# Patient Record
Sex: FEMALE | Race: WHITE | NOT HISPANIC OR LATINO | URBAN - METROPOLITAN AREA
[De-identification: names, ages, dates, MRNs, and addresses within clinical notes are randomized per-mention and may not be internally consistent; named-entity substitution may affect disease eponyms.]

---

## 2019-08-12 ENCOUNTER — INPATIENT (INPATIENT)
Facility: HOSPITAL | Age: 49
LOS: 2 days | Discharge: HOME | DRG: 585 | End: 2019-08-15
Attending: PLASTIC SURGERY | Admitting: PLASTIC SURGERY
Payer: COMMERCIAL

## 2019-08-12 VITALS
RESPIRATION RATE: 18 BRPM | SYSTOLIC BLOOD PRESSURE: 124 MMHG | OXYGEN SATURATION: 97 % | TEMPERATURE: 98 F | HEART RATE: 74 BPM | WEIGHT: 153.66 LBS | DIASTOLIC BLOOD PRESSURE: 81 MMHG | HEIGHT: 65 IN

## 2019-08-12 DIAGNOSIS — Z98.82 BREAST IMPLANT STATUS: Chronic | ICD-10-CM

## 2019-08-12 DIAGNOSIS — Z90.12 ACQUIRED ABSENCE OF LEFT BREAST AND NIPPLE: Chronic | ICD-10-CM
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
## 2019-08-12 DIAGNOSIS — Z98.891 HISTORY OF UTERINE SCAR FROM PREVIOUS SURGERY: Chronic | ICD-10-CM

## 2019-08-12 RX ORDER — HYDROMORPHONE HYDROCHLORIDE 2 MG/ML
0.5 INJECTION INTRAMUSCULAR; INTRAVENOUS; SUBCUTANEOUS
Refills: 0 | Status: DISCONTINUED | OUTPATIENT
Start: 2019-08-12 | End: 2019-08-15

## 2019-08-12 RX ORDER — KETOROLAC TROMETHAMINE 30 MG/ML
15 SYRINGE (ML) INJECTION EVERY 6 HOURS
Refills: 0 | Status: DISCONTINUED | OUTPATIENT
Start: 2019-08-12 | End: 2019-08-15

## 2019-08-12 RX ORDER — SERTRALINE 25 MG/1
1 TABLET, FILM COATED ORAL
Qty: 0 | Refills: 0 | DISCHARGE

## 2019-08-12 RX ORDER — CEFAZOLIN SODIUM 1 G
2000 VIAL (EA) INJECTION EVERY 8 HOURS
Refills: 0 | Status: DISCONTINUED | OUTPATIENT
Start: 2019-08-12 | End: 2019-08-12

## 2019-08-12 RX ORDER — CEFAZOLIN SODIUM 1 G
2000 VIAL (EA) INJECTION EVERY 8 HOURS
Refills: 0 | Status: DISCONTINUED | OUTPATIENT
Start: 2019-08-12 | End: 2019-08-13

## 2019-08-12 RX ORDER — HEPARIN SODIUM 5000 [USP'U]/ML
5000 INJECTION INTRAVENOUS; SUBCUTANEOUS EVERY 8 HOURS
Refills: 0 | Status: DISCONTINUED | OUTPATIENT
Start: 2019-08-13 | End: 2019-08-13

## 2019-08-12 RX ORDER — SODIUM CHLORIDE 9 MG/ML
1000 INJECTION, SOLUTION INTRAVENOUS
Refills: 0 | Status: DISCONTINUED | OUTPATIENT
Start: 2019-08-12 | End: 2019-08-13

## 2019-08-12 RX ORDER — ACETAMINOPHEN 500 MG
1000 TABLET ORAL ONCE
Refills: 0 | Status: COMPLETED | OUTPATIENT
Start: 2019-08-12 | End: 2019-08-12

## 2019-08-12 RX ORDER — BUPIVACAINE 13.3 MG/ML
20 INJECTION, SUSPENSION, LIPOSOMAL INFILTRATION ONCE
Refills: 0 | Status: DISCONTINUED | OUTPATIENT
Start: 2019-08-12 | End: 2019-08-15

## 2019-08-12 RX ORDER — ACETAMINOPHEN 500 MG
1000 TABLET ORAL ONCE
Refills: 0 | Status: COMPLETED | OUTPATIENT
Start: 2019-08-13 | End: 2019-08-13

## 2019-08-12 RX ORDER — OXYCODONE HYDROCHLORIDE 5 MG/1
10 TABLET ORAL EVERY 6 HOURS
Refills: 0 | Status: DISCONTINUED | OUTPATIENT
Start: 2019-08-12 | End: 2019-08-15

## 2019-08-12 RX ORDER — OXYCODONE HYDROCHLORIDE 5 MG/1
5 TABLET ORAL EVERY 6 HOURS
Refills: 0 | Status: DISCONTINUED | OUTPATIENT
Start: 2019-08-12 | End: 2019-08-15

## 2019-08-12 RX ORDER — ONDANSETRON 8 MG/1
4 TABLET, FILM COATED ORAL EVERY 6 HOURS
Refills: 0 | Status: DISCONTINUED | OUTPATIENT
Start: 2019-08-12 | End: 2019-08-15

## 2019-08-12 RX ADMIN — Medication 15 MILLIGRAM(S): at 18:30

## 2019-08-12 RX ADMIN — OXYCODONE HYDROCHLORIDE 5 MILLIGRAM(S): 5 TABLET ORAL at 22:20

## 2019-08-12 RX ADMIN — Medication 15 MILLIGRAM(S): at 18:54

## 2019-08-12 RX ADMIN — SODIUM CHLORIDE 150 MILLILITER(S): 9 INJECTION, SOLUTION INTRAVENOUS at 18:55

## 2019-08-12 RX ADMIN — Medication 2000 MILLIGRAM(S): at 22:13

## 2019-08-12 RX ADMIN — Medication 400 MILLIGRAM(S): at 20:32

## 2019-08-12 NOTE — H&P PST ADULT - NSICDXPROBLEM_GEN_ALL_CORE_FT
PROBLEM DIAGNOSES  Problem: Breast CA  Assessment and Plan:   - OR today  - NPO  - admit to plastic surgery

## 2019-08-12 NOTE — H&P PST ADULT - ASSESSMENT
50 y/o female with hx of breast cancer presents for removal of textured L breast implant and reconstruction with vickey flaps

## 2019-08-12 NOTE — H&P PST ADULT - HISTORY OF PRESENT ILLNESS
50 y/o female with hx left breast ca and reconstruction with textured implant, presents today for removal of implant and reconstruction with DONNIE flaps. She denies any complaints at this time.

## 2019-08-13 LAB
ANION GAP SERPL CALC-SCNC: 6 MMOL/L — SIGNIFICANT CHANGE UP (ref 5–17)
BUN SERPL-MCNC: 12 MG/DL — SIGNIFICANT CHANGE UP (ref 7–23)
CALCIUM SERPL-MCNC: 8.6 MG/DL — SIGNIFICANT CHANGE UP (ref 8.4–10.5)
CHLORIDE SERPL-SCNC: 106 MMOL/L — SIGNIFICANT CHANGE UP (ref 96–108)
CO2 SERPL-SCNC: 31 MMOL/L — SIGNIFICANT CHANGE UP (ref 22–31)
CREAT SERPL-MCNC: 0.7 MG/DL — SIGNIFICANT CHANGE UP (ref 0.5–1.3)
GLUCOSE SERPL-MCNC: 101 MG/DL — HIGH (ref 70–99)
HCT VFR BLD CALC: 34.4 % — LOW (ref 34.5–45)
HGB BLD-MCNC: 10.8 G/DL — LOW (ref 11.5–15.5)
MCHC RBC-ENTMCNC: 31.4 GM/DL — LOW (ref 32–36)
MCHC RBC-ENTMCNC: 31.6 PG — SIGNIFICANT CHANGE UP (ref 27–34)
MCV RBC AUTO: 100.6 FL — HIGH (ref 80–100)
NRBC # BLD: 0 /100 WBCS — SIGNIFICANT CHANGE UP (ref 0–0)
PLATELET # BLD AUTO: 205 K/UL — SIGNIFICANT CHANGE UP (ref 150–400)
POTASSIUM SERPL-MCNC: 4.6 MMOL/L — SIGNIFICANT CHANGE UP (ref 3.5–5.3)
POTASSIUM SERPL-SCNC: 4.6 MMOL/L — SIGNIFICANT CHANGE UP (ref 3.5–5.3)
RBC # BLD: 3.42 M/UL — LOW (ref 3.8–5.2)
RBC # FLD: 12.7 % — SIGNIFICANT CHANGE UP (ref 10.3–14.5)
SODIUM SERPL-SCNC: 143 MMOL/L — SIGNIFICANT CHANGE UP (ref 135–145)
WBC # BLD: 6.72 K/UL — SIGNIFICANT CHANGE UP (ref 3.8–10.5)
WBC # FLD AUTO: 6.72 K/UL — SIGNIFICANT CHANGE UP (ref 3.8–10.5)

## 2019-08-13 RX ORDER — BENZOCAINE AND MENTHOL 5; 1 G/100ML; G/100ML
1 LIQUID ORAL
Refills: 0 | Status: DISCONTINUED | OUTPATIENT
Start: 2019-08-13 | End: 2019-08-15

## 2019-08-13 RX ORDER — DIAZEPAM 5 MG
5 TABLET ORAL EVERY 6 HOURS
Refills: 0 | Status: DISCONTINUED | OUTPATIENT
Start: 2019-08-13 | End: 2019-08-15

## 2019-08-13 RX ORDER — HEPARIN SODIUM 5000 [USP'U]/ML
5000 INJECTION INTRAVENOUS; SUBCUTANEOUS EVERY 8 HOURS
Refills: 0 | Status: DISCONTINUED | OUTPATIENT
Start: 2019-08-13 | End: 2019-08-15

## 2019-08-13 RX ORDER — ACETAMINOPHEN 500 MG
650 TABLET ORAL EVERY 6 HOURS
Refills: 0 | Status: DISCONTINUED | OUTPATIENT
Start: 2019-08-13 | End: 2019-08-15

## 2019-08-13 RX ORDER — SODIUM CHLORIDE 9 MG/ML
1000 INJECTION, SOLUTION INTRAVENOUS
Refills: 0 | Status: DISCONTINUED | OUTPATIENT
Start: 2019-08-13 | End: 2019-08-13

## 2019-08-13 RX ADMIN — Medication 15 MILLIGRAM(S): at 00:36

## 2019-08-13 RX ADMIN — OXYCODONE HYDROCHLORIDE 10 MILLIGRAM(S): 5 TABLET ORAL at 16:30

## 2019-08-13 RX ADMIN — OXYCODONE HYDROCHLORIDE 5 MILLIGRAM(S): 5 TABLET ORAL at 05:00

## 2019-08-13 RX ADMIN — Medication 75 MILLIGRAM(S): at 17:30

## 2019-08-13 RX ADMIN — Medication 15 MILLIGRAM(S): at 18:36

## 2019-08-13 RX ADMIN — Medication 15 MILLIGRAM(S): at 01:15

## 2019-08-13 RX ADMIN — OXYCODONE HYDROCHLORIDE 5 MILLIGRAM(S): 5 TABLET ORAL at 04:21

## 2019-08-13 RX ADMIN — OXYCODONE HYDROCHLORIDE 5 MILLIGRAM(S): 5 TABLET ORAL at 00:00

## 2019-08-13 RX ADMIN — HEPARIN SODIUM 5000 UNIT(S): 5000 INJECTION INTRAVENOUS; SUBCUTANEOUS at 14:36

## 2019-08-13 RX ADMIN — Medication 15 MILLIGRAM(S): at 23:30

## 2019-08-13 RX ADMIN — Medication 15 MILLIGRAM(S): at 17:30

## 2019-08-13 RX ADMIN — OXYCODONE HYDROCHLORIDE 5 MILLIGRAM(S): 5 TABLET ORAL at 11:11

## 2019-08-13 RX ADMIN — OXYCODONE HYDROCHLORIDE 10 MILLIGRAM(S): 5 TABLET ORAL at 15:06

## 2019-08-13 RX ADMIN — OXYCODONE HYDROCHLORIDE 5 MILLIGRAM(S): 5 TABLET ORAL at 20:00

## 2019-08-13 RX ADMIN — Medication 5 MILLIGRAM(S): at 11:16

## 2019-08-13 RX ADMIN — Medication 1000 MILLIGRAM(S): at 08:15

## 2019-08-13 RX ADMIN — Medication 2000 MILLIGRAM(S): at 05:46

## 2019-08-13 RX ADMIN — Medication 400 MILLIGRAM(S): at 07:48

## 2019-08-13 RX ADMIN — OXYCODONE HYDROCHLORIDE 5 MILLIGRAM(S): 5 TABLET ORAL at 10:22

## 2019-08-13 RX ADMIN — Medication 15 MILLIGRAM(S): at 23:02

## 2019-08-13 RX ADMIN — HEPARIN SODIUM 5000 UNIT(S): 5000 INJECTION INTRAVENOUS; SUBCUTANEOUS at 05:46

## 2019-08-13 RX ADMIN — HEPARIN SODIUM 5000 UNIT(S): 5000 INJECTION INTRAVENOUS; SUBCUTANEOUS at 21:54

## 2019-08-13 RX ADMIN — Medication 5 MILLIGRAM(S): at 21:55

## 2019-08-13 RX ADMIN — Medication 15 MILLIGRAM(S): at 06:00

## 2019-08-13 RX ADMIN — BENZOCAINE AND MENTHOL 1 LOZENGE: 5; 1 LIQUID ORAL at 22:04

## 2019-08-13 RX ADMIN — Medication 15 MILLIGRAM(S): at 13:06

## 2019-08-13 RX ADMIN — Medication 15 MILLIGRAM(S): at 05:46

## 2019-08-13 RX ADMIN — OXYCODONE HYDROCHLORIDE 5 MILLIGRAM(S): 5 TABLET ORAL at 19:26

## 2019-08-13 RX ADMIN — Medication 15 MILLIGRAM(S): at 14:52

## 2019-08-13 NOTE — PROGRESS NOTE ADULT - ASSESSMENT
Pt doing well. Will continue to monitor left DONNIE flaps. Will prepare for discharge home tomorrow.

## 2019-08-13 NOTE — PROGRESS NOTE ADULT - SUBJECTIVE AND OBJECTIVE BOX
POD1 s/p L breast reconstruction - implant removal, capsulectomy, and stacked DONNIE flaps.    No events overnight.     Vital Signs Last 24 Hrs  T(C): 36.7 (13 Aug 2019 05:04), Max: 36.8 (12 Aug 2019 22:02)  T(F): 98.1 (13 Aug 2019 05:04), Max: 98.2 (12 Aug 2019 22:02)  HR: 82 (13 Aug 2019 04:15) (82 - 112)  BP: 105/56 (13 Aug 2019 04:15) (102/52 - 126/63)  BP(mean): 76 (13 Aug 2019 04:15) (75 - 82)  RR: 17 (13 Aug 2019 04:15) (14 - 18)  SpO2: 97% (13 Aug 2019 04:15) (94% - 100%)    Flaps soft, warm, 2s cap refill. Arterial doppler signals audible.  Abdomen with Prevena dressing in place. Incisions intact.  JPs serosang. POD1 s/p L breast reconstruction - implant removal, capsulectomy, and stacked DONNIE flaps.    No events overnight.     Vital Signs Last 24 Hrs  T(C): 36.7 (13 Aug 2019 05:04), Max: 36.8 (12 Aug 2019 22:02)  T(F): 98.1 (13 Aug 2019 05:04), Max: 98.2 (12 Aug 2019 22:02)  HR: 82 (13 Aug 2019 04:15) (82 - 112)  BP: 105/56 (13 Aug 2019 04:15) (102/52 - 126/63)  BP(mean): 76 (13 Aug 2019 04:15) (75 - 82)  RR: 17 (13 Aug 2019 04:15) (14 - 18)  SpO2: 97% (13 Aug 2019 04:15) (94% - 100%)    Flaps soft, warm, 2s cap refill. Arterial doppler signals audible.  Abdomen with dressings CDI, mild strikethrough. Incisions intact.  JPs serosang.

## 2019-08-13 NOTE — PROGRESS NOTE ADULT - SUBJECTIVE AND OBJECTIVE BOX
Pt doing well 1 day s/p left removal of implant and stacked DONNIE flap surgery to left breast. Pain is controlled. Pt is ambulating. Vitals stable. Incisions are clean, dry and intact. Left flaps are viable with a cap refill of 2 seconds and strong arterial pulses.

## 2019-08-14 DIAGNOSIS — C50.919 MALIGNANT NEOPLASM OF UNSPECIFIED SITE OF UNSPECIFIED FEMALE BREAST: ICD-10-CM

## 2019-08-14 RX ORDER — BACITRACIN ZINC 500 UNIT/G
1 OINTMENT IN PACKET (EA) TOPICAL
Refills: 0 | Status: DISCONTINUED | OUTPATIENT
Start: 2019-08-14 | End: 2019-08-15

## 2019-08-14 RX ADMIN — HEPARIN SODIUM 5000 UNIT(S): 5000 INJECTION INTRAVENOUS; SUBCUTANEOUS at 22:10

## 2019-08-14 RX ADMIN — Medication 15 MILLIGRAM(S): at 11:15

## 2019-08-14 RX ADMIN — Medication 15 MILLIGRAM(S): at 07:38

## 2019-08-14 RX ADMIN — OXYCODONE HYDROCHLORIDE 5 MILLIGRAM(S): 5 TABLET ORAL at 18:15

## 2019-08-14 RX ADMIN — Medication 15 MILLIGRAM(S): at 18:15

## 2019-08-14 RX ADMIN — OXYCODONE HYDROCHLORIDE 10 MILLIGRAM(S): 5 TABLET ORAL at 03:26

## 2019-08-14 RX ADMIN — Medication 15 MILLIGRAM(S): at 23:44

## 2019-08-14 RX ADMIN — HEPARIN SODIUM 5000 UNIT(S): 5000 INJECTION INTRAVENOUS; SUBCUTANEOUS at 14:40

## 2019-08-14 RX ADMIN — OXYCODONE HYDROCHLORIDE 10 MILLIGRAM(S): 5 TABLET ORAL at 04:00

## 2019-08-14 RX ADMIN — Medication 75 MILLIGRAM(S): at 07:08

## 2019-08-14 RX ADMIN — Medication 5 MILLIGRAM(S): at 22:10

## 2019-08-14 RX ADMIN — BENZOCAINE AND MENTHOL 1 LOZENGE: 5; 1 LIQUID ORAL at 07:09

## 2019-08-14 RX ADMIN — OXYCODONE HYDROCHLORIDE 10 MILLIGRAM(S): 5 TABLET ORAL at 12:14

## 2019-08-14 RX ADMIN — Medication 75 MILLIGRAM(S): at 18:00

## 2019-08-14 RX ADMIN — HEPARIN SODIUM 5000 UNIT(S): 5000 INJECTION INTRAVENOUS; SUBCUTANEOUS at 07:09

## 2019-08-14 RX ADMIN — Medication 15 MILLIGRAM(S): at 12:14

## 2019-08-14 RX ADMIN — Medication 15 MILLIGRAM(S): at 07:08

## 2019-08-14 RX ADMIN — OXYCODONE HYDROCHLORIDE 10 MILLIGRAM(S): 5 TABLET ORAL at 11:15

## 2019-08-14 RX ADMIN — Medication 15 MILLIGRAM(S): at 18:00

## 2019-08-14 RX ADMIN — OXYCODONE HYDROCHLORIDE 5 MILLIGRAM(S): 5 TABLET ORAL at 19:15

## 2019-08-14 RX ADMIN — Medication 1 APPLICATION(S): at 18:00

## 2019-08-14 NOTE — PROGRESS NOTE ADULT - SUBJECTIVE AND OBJECTIVE BOX
SUBJECTIVE:  Doing well.   No overnight events.     OBJECTIVE:     ** VITAL SIGNS / I&O's **    Vital Signs Last 24 Hrs  T(C): 36.6 (14 Aug 2019 04:50), Max: 37.6 (13 Aug 2019 17:06)  T(F): 97.9 (14 Aug 2019 04:50), Max: 99.6 (13 Aug 2019 17:06)  HR: 88 (14 Aug 2019 05:20) (85 - 99)  BP: 119/71 (14 Aug 2019 05:20) (94/50 - 136/63)  BP(mean): 91 (14 Aug 2019 05:20) (65 - 91)  RR: 16 (14 Aug 2019 05:20) (16 - 19)  SpO2: 95% (14 Aug 2019 05:20) (93% - 98%)      13 Aug 2019 07:01  -  14 Aug 2019 07:00  --------------------------------------------------------  IN:    lactated ringers.: 100 mL    lactated ringers.: 150 mL    Oral Fluid: 720 mL  Total IN: 970 mL    OUT:    Bulb: 105 mL    Bulb: 300 mL    Bulb: 95 mL    Indwelling Catheter - Urethral: 425 mL    Voided: 650 mL  Total OUT: 1575 mL    Total NET: -605 mL          ** PHYSICAL EXAM **    -- CONSTITUTIONAL: AOx3. NAD.   -- BREASTS: L soft, no collections, skin paddle good color x 2, + doppler signal x 2, JPs serosanguinous  -- RESPIRATORY: unlabored, no respiratory distress  -- ABDOMEN: Soft. No collections. Prevena in place and holding suction, BREA's serosanguinous.    ** LABS**                          10.8   6.72  )-----------( 205      ( 13 Aug 2019 08:14 )             34.4     13 Aug 2019 08:14    143    |  106    |  12     ----------------------------<  101    4.6     |  31     |  0.70     Ca    8.6        13 Aug 2019 08:14        CAPILLARY BLOOD GLUCOSE SUBJECTIVE:  Doing well.   No overnight events.     OBJECTIVE:     ** VITAL SIGNS / I&O's **    Vital Signs Last 24 Hrs  T(C): 36.6 (14 Aug 2019 04:50), Max: 37.6 (13 Aug 2019 17:06)  T(F): 97.9 (14 Aug 2019 04:50), Max: 99.6 (13 Aug 2019 17:06)  HR: 88 (14 Aug 2019 05:20) (85 - 99)  BP: 119/71 (14 Aug 2019 05:20) (94/50 - 136/63)  BP(mean): 91 (14 Aug 2019 05:20) (65 - 91)  RR: 16 (14 Aug 2019 05:20) (16 - 19)  SpO2: 95% (14 Aug 2019 05:20) (93% - 98%)      13 Aug 2019 07:01  -  14 Aug 2019 07:00  --------------------------------------------------------  IN:    lactated ringers.: 100 mL    lactated ringers.: 150 mL    Oral Fluid: 720 mL  Total IN: 970 mL    OUT:    Bulb: 105 mL    Bulb: 300 mL    Bulb: 95 mL    Indwelling Catheter - Urethral: 425 mL    Voided: 650 mL  Total OUT: 1575 mL    Total NET: -605 mL          ** PHYSICAL EXAM **    -- CONSTITUTIONAL: AOx3. NAD.   -- BREASTS: L soft, no collections, skin paddle good color x 2, + doppler signal x 2, JPs serosanguinous  -- RESPIRATORY: unlabored, no respiratory distress  -- ABDOMEN: Soft. No collections. BREA's serosanguinous.    ** LABS**                          10.8   6.72  )-----------( 205      ( 13 Aug 2019 08:14 )             34.4     13 Aug 2019 08:14    143    |  106    |  12     ----------------------------<  101    4.6     |  31     |  0.70     Ca    8.6        13 Aug 2019 08:14        CAPILLARY BLOOD GLUCOSE

## 2019-08-14 NOTE — PROGRESS NOTE ADULT - ASSESSMENT
50 y/o female s/p L breast reconstruction with stacked DONNIE flaps  - continue flap checks  - continue ambulating  - regular diet  - dvt ppx  - prn analgesia

## 2019-08-14 NOTE — PHYSICAL THERAPY INITIAL EVALUATION ADULT - GENERAL OBSERVATIONS, REHAB EVAL
pt received/returned sitting in bedside chair, +heplock, +dopplers, +3 BREA drains, +DONNIE flaps C/D/I, c/o abdominal pain

## 2019-08-14 NOTE — PHYSICAL THERAPY INITIAL EVALUATION ADULT - GAIT DEVIATIONS NOTED, PT EVAL
decreased step length/decreased weight-shifting ability/increased time in double stance/decreased anabella

## 2019-08-14 NOTE — PHYSICAL THERAPY INITIAL EVALUATION ADULT - CRITERIA FOR SKILLED THERAPEUTIC INTERVENTIONS
anticipated discharge recommendation/functional limitations in following categories/risk reduction/prevention/impairments found/therapy frequency/rehab potential

## 2019-08-14 NOTE — PHYSICAL THERAPY INITIAL EVALUATION ADULT - ADDITIONAL COMMENTS
pt lives in private home w/  and children. States that there is 1 flight of stairs to get to her bedroom. Denies use of DME for ambulation at home. Denies hx of recent falls. States that she gets home PT for lymphatic massage

## 2019-08-15 VITALS — TEMPERATURE: 98 F

## 2019-08-15 LAB — SURGICAL PATHOLOGY STUDY: SIGNIFICANT CHANGE UP

## 2019-08-15 PROCEDURE — 36415 COLL VENOUS BLD VENIPUNCTURE: CPT

## 2019-08-15 PROCEDURE — C1781: CPT

## 2019-08-15 PROCEDURE — 85027 COMPLETE CBC AUTOMATED: CPT

## 2019-08-15 PROCEDURE — 97162 PT EVAL MOD COMPLEX 30 MIN: CPT

## 2019-08-15 PROCEDURE — 80048 BASIC METABOLIC PNL TOTAL CA: CPT

## 2019-08-15 PROCEDURE — C1889: CPT

## 2019-08-15 PROCEDURE — 88300 SURGICAL PATH GROSS: CPT

## 2019-08-15 PROCEDURE — 88305 TISSUE EXAM BY PATHOLOGIST: CPT

## 2019-08-15 PROCEDURE — 88304 TISSUE EXAM BY PATHOLOGIST: CPT

## 2019-08-15 PROCEDURE — 97116 GAIT TRAINING THERAPY: CPT

## 2019-08-15 RX ORDER — OXYCODONE HYDROCHLORIDE 5 MG/1
1 TABLET ORAL
Qty: 0 | Refills: 0 | DISCHARGE
Start: 2019-08-15

## 2019-08-15 RX ORDER — DIAZEPAM 5 MG
1 TABLET ORAL
Qty: 0 | Refills: 0 | DISCHARGE
Start: 2019-08-15

## 2019-08-15 RX ORDER — LIDOCAINE 4 G/100G
1 CREAM TOPICAL
Qty: 0 | Refills: 0 | DISCHARGE
Start: 2019-08-15

## 2019-08-15 RX ORDER — ACETAMINOPHEN 500 MG
2 TABLET ORAL
Qty: 0 | Refills: 0 | DISCHARGE
Start: 2019-08-15

## 2019-08-15 RX ORDER — LIDOCAINE 4 G/100G
1 CREAM TOPICAL DAILY
Refills: 0 | Status: DISCONTINUED | OUTPATIENT
Start: 2019-08-15 | End: 2019-08-15

## 2019-08-15 RX ORDER — SERTRALINE 25 MG/1
25 TABLET, FILM COATED ORAL DAILY
Refills: 0 | Status: DISCONTINUED | OUTPATIENT
Start: 2019-08-15 | End: 2019-08-15

## 2019-08-15 RX ORDER — BACITRACIN ZINC 500 UNIT/G
1 OINTMENT IN PACKET (EA) TOPICAL
Qty: 0 | Refills: 0 | DISCHARGE
Start: 2019-08-15

## 2019-08-15 RX ORDER — BENZOCAINE AND MENTHOL 5; 1 G/100ML; G/100ML
1 LIQUID ORAL
Refills: 0 | Status: DISCONTINUED | OUTPATIENT
Start: 2019-08-15 | End: 2019-08-15

## 2019-08-15 RX ADMIN — Medication 15 MILLIGRAM(S): at 06:17

## 2019-08-15 RX ADMIN — Medication 15 MILLIGRAM(S): at 05:47

## 2019-08-15 RX ADMIN — Medication 15 MILLIGRAM(S): at 00:15

## 2019-08-15 RX ADMIN — Medication 5 MILLIGRAM(S): at 11:59

## 2019-08-15 RX ADMIN — LIDOCAINE 1 PATCH: 4 CREAM TOPICAL at 11:59

## 2019-08-15 RX ADMIN — HEPARIN SODIUM 5000 UNIT(S): 5000 INJECTION INTRAVENOUS; SUBCUTANEOUS at 05:47

## 2019-08-15 RX ADMIN — OXYCODONE HYDROCHLORIDE 10 MILLIGRAM(S): 5 TABLET ORAL at 09:18

## 2019-08-15 RX ADMIN — Medication 15 MILLIGRAM(S): at 11:59

## 2019-08-15 RX ADMIN — Medication 1 APPLICATION(S): at 05:48

## 2019-08-15 RX ADMIN — Medication 75 MILLIGRAM(S): at 05:47

## 2019-08-15 NOTE — PROGRESS NOTE ADULT - ASSESSMENT
Assessment/Plan:  Patient is a 49y old  Female who presents with a chief complaint of hx of breast cancer, DONNIE flap surgery (13 Aug 2019 10:16). POD 3 from left breast reconstruction with DONNIE flaps. Doing well  - Encourage ambulation  - Continue current regimen of pain control  - Dispo planning  - SCDs

## 2019-08-15 NOTE — DISCHARGE NOTE PROVIDER - HOSPITAL COURSE
48 yo F underwent L breast implant removal and breast reconstruction with stacked DONNIE flaps. Patient tolerated the procedure well and had uneventful postoperative hospital course.  On the day of the discharge, patient was evaluated and deemed in stable condition to be discharged to home. Follow up in one week with Dr. Booker in the office.

## 2019-08-15 NOTE — PROGRESS NOTE ADULT - SUBJECTIVE AND OBJECTIVE BOX
GLENDY ONEILL  [id number]    Subjective:  Patient is a 49y old  Female who presents with a chief complaint of hx of breast cancer, DONNIE flap surgery (13 Aug 2019 10:16). No acute events overnight. Pain controlled       Objective:  T(C): 36.6 (08-15-19 @ 09:06), Max: 37.4 (08-14-19 @ 22:15)  HR: 86 (08-15-19 @ 04:30) (82 - 100)  BP: 111/65 (08-15-19 @ 04:30) (105/56 - 120/57)  RR: 18 (08-15-19 @ 04:30) (16 - 18)  SpO2: 96% (08-15-19 @ 04:30) (95% - 98%)  Wt(kg): --           08-14 @ 07:01  -  08-15 @ 07:00  --------------------------------------------------------  IN: 960 mL / OUT: 1405 mL / NET: -445 mL      PHYSICAL EXAM:  -- CONSTITUTIONAL: AOx3. NAD.   -- LEFT BREAST / FLAP: Mastectomy skin flap ecchymosis, stable. No collections. Flaps soft and pink with 2-3 second capillary refill. Strong arterial signals. Drain(s) serosanguinous.  -- CARDIOVASCULAR: Regular rate and rhythm. S1, S2.  -- RESPIRATORY: Bilateral breath sounds.   -- ABDOMEN: Soft. No collections. Incision intact. Umbilicus viable. Drains serosanguinous.          MEDICATIONS  (STANDING):  BACItracin   Ointment 1 Application(s) Topical two times a day  BUpivacaine liposome 1.3% Injectable (no eMAR) 20 milliLiter(s) Local Injection once  heparin  Injectable 5000 Unit(s) SubCutaneous every 8 hours  HYDROmorphone  Injectable 0.5 milliGRAM(s) IV Push every 30 minutes  ketorolac   Injectable 15 milliGRAM(s) IV Push every 6 hours  pregabalin 75 milliGRAM(s) Oral two times a day    MEDICATIONS  (PRN):  acetaminophen   Tablet .. 650 milliGRAM(s) Oral every 6 hours PRN mild pain  benzocaine 15 mG/menthol 3.6 mG (Sugar-Free) Lozenge 1 Lozenge Oral every 3 hours PRN Sore Throat  diazepam    Tablet 5 milliGRAM(s) Oral every 6 hours PRN muscle spasms.  ondansetron Injectable 4 milliGRAM(s) IV Push every 6 hours PRN Nausea and/or Vomiting  oxyCODONE    IR 5 milliGRAM(s) Oral every 6 hours PRN Moderate Pain (4 - 6)  oxyCODONE    IR 10 milliGRAM(s) Oral every 6 hours PRN Severe Pain (7 - 10)

## 2019-08-15 NOTE — DISCHARGE NOTE NURSING/CASE MANAGEMENT/SOCIAL WORK - NSDCDPATPORTLINK_GEN_ALL_CORE
You can access the [facility] Patient Portal, offered by [facility], by registering with the following website: [email or web address]

## 2019-08-15 NOTE — DISCHARGE NOTE PROVIDER - NSDCFUADDINST_GEN_ALL_CORE_FT
*Please refer to the post-operative care instructions provided from Dr. Booker’s office.     -Follow up with Plastic & Reconstructive Surgeon, Dr. Booker in 1 week in the office.       -Continue BREA drain care as instructed. (Empty and record the BREA drainage twice daily after discharge. Also, strip/milk the drain tubing each time to minimize clogging. Bring the recorded drain amounts to the office so that it can be reviewed by the physician.)     -Take Percocet as prescribed for pain control.     -Diet: no restrictions.     -Showers are permitted the day of discharge. The drains and the incision lines can get wet in the shower. Do not take a bath. Pin the drains to a bathrobe belt or string or a small towel draped over your neck in order that the drains do not dangle from your skin while in the shower. Keep incision sites and BREA drain sites clean & dry after showering.     -No heavy lifting >20 pounds or strenuous exercises.       -Call Doctor’s office or return to ER if: fever (temperature >101.4F), chills, chest pain, shortness of breath, uncontrolled/severe pain, persistent nausea/vomiting, or bleeding/oozing/redness/swelling at incision sites.    -For routine questions, call the office weekdays 9:00 A.M. - 5:00 P.M.  For emergencies after business hours, call any time and the answering service will put you in touch with Dr. Booker. *Please refer to the post-operative care instructions provided from Dr. Booker’s office.     -Follow up with Plastic & Reconstructive Surgeon, Dr. Booker in 1 week in the office.       -Continue BREA drain care as instructed. (Empty and record the BREA drainage twice daily after discharge. Also, strip/milk the drain tubing each time to minimize clogging. Bring the recorded drain amounts to the office so that it can be reviewed by the physician.)     -Take Percocet as prescribed for pain control.     -Diet: no restrictions.     -Sponge bathe only until seen in office.     -No heavy lifting >20 pounds or strenuous exercises.       -Call Doctor’s office or return to ER if: fever (temperature >101.4F), chills, chest pain, shortness of breath, uncontrolled/severe pain, persistent nausea/vomiting, or bleeding/oozing/redness/swelling at incision sites.    -For routine questions, call the office weekdays 9:00 A.M. - 5:00 P.M.  For emergencies after business hours, call any time and the answering service will put you in touch with Dr. Booker.

## 2019-08-15 NOTE — DISCHARGE NOTE PROVIDER - CARE PROVIDER_API CALL
Joshua Booker)  Plastic Surgery  [address] [address]  Phone: [phone]  Fax: [phone]  Follow Up Time:

## 2019-08-19 DIAGNOSIS — I97.2 POSTMASTECTOMY LYMPHEDEMA SYNDROME: ICD-10-CM

## 2019-08-19 DIAGNOSIS — F41.9 ANXIETY DISORDER, UNSPECIFIED: ICD-10-CM

## 2019-08-19 DIAGNOSIS — Z85.3 PERSONAL HISTORY OF MALIGNANT NEOPLASM OF BREAST: ICD-10-CM

## 2019-08-19 DIAGNOSIS — Z88.1 ALLERGY STATUS TO OTHER ANTIBIOTIC AGENTS STATUS: ICD-10-CM

## 2019-08-19 DIAGNOSIS — Z92.3 PERSONAL HISTORY OF IRRADIATION: ICD-10-CM

## 2019-08-19 DIAGNOSIS — Z92.21 PERSONAL HISTORY OF ANTINEOPLASTIC CHEMOTHERAPY: ICD-10-CM

## 2019-08-19 DIAGNOSIS — T85.44XA CAPSULAR CONTRACTURE OF BREAST IMPLANT, INITIAL ENCOUNTER: ICD-10-CM

## 2019-08-19 DIAGNOSIS — Z98.890 OTHER SPECIFIED POSTPROCEDURAL STATES: ICD-10-CM

## 2019-08-19 DIAGNOSIS — Z88.2 ALLERGY STATUS TO SULFONAMIDES: ICD-10-CM

## 2019-08-19 DIAGNOSIS — F32.9 MAJOR DEPRESSIVE DISORDER, SINGLE EPISODE, UNSPECIFIED: ICD-10-CM

## 2022-05-11 NOTE — PATIENT PROFILE ADULT - NSPRESCRALCSCORE_GEN_A_NUR_CAL
Answers for HPI/ROS submitted by the patient on 5/6/2022  Please describe your symptoms.: Severe pain around the left shoulder blade in the middle towards the spine.  Throbbing pain, worse at night or at rest.  Throbbing pain down the back part of the upper left arm to the elbow.  Have you had these symptoms before?: Yes  How long have you been having these symptoms?: 5-7 days  Please list any medications you are currently taking for this condition.: Pregabalin and Aleve.    I'm taking Prednisone for leg pain unrelated to this issue.  Please describe any probable cause for these symptoms. : Unknown.  It seems to flair up in the springtime like some of my other acute joint/muscle issues have in the past.  It flared up last spring as well.  It could be over use at the computer or household work or anything I do on a daily basis.  I don't recall overworking it or injuring it recently.  What is the primary reason for your visit?: Other    Chief Complaint  Pain (shoulder)    Subjective          Sindy Martin presents to [facility]  History of Present Illness    Sindy is a 64-year-old female who presents today for left shoulder and left elbow pain.    The patient complains of left shoulder and left elbow pain. She localizes the pain to the medial aspect of the left scapula at the upper end of the scapula just medial to it, not quite to the spine processes, and it is a very intense pain in that area of her back. She denies any rash. The patient states that she has been pressing on her left shoulder against a chair. She notes that the pain is throbbing. The patient states that she has not had any x-rays of her left shoulder. She notes that it has been approximately 10 to 12 years since she has had an MRI of her left shoulder. The patient states that she has had this discomfort intermittently since 04/2021. She notes that when she went for her upper EUS, they had her laying on her left  "side and after she got up from that, her left elbow started throbbing. The patient states that she was started on pregabalin on 01/18/2022. She notes that she tried to rest her left shoulder all day and kept heat on it. She states that she felt better on 01/20/2022 and 01/21/2022. The patient states that she was working on the computer on 01/22/2022 and has had to do computer work the last few days. She notes that she has been taking Aleve around the clock and pregabalin. The patient states that she is about to finish up the prednisone that was prescribed for her knees. She notes that it helped when she backed down off the dosage again. The patient states that her bilateral knee pain is worse at night. The patient states that she has used lidocaine patches all the time and it helps. She notes that she has used the patches on her back and it eases the pain, but now it will not take it away completely. The patient states that she has capsaicin, but she has not used it because it burns the skin sometimes. She notes that she has never had shingles. The patient states that she does not recall any aggravating factors or a fall.    The patient states that she had cancer in 2014 in her breast. She notes that she sees her oncologist every 6 months. The patient states that she has never had metastatic disease. She notes that she had a sentinel node under her left arm and it was fine. The patient states that she has had a bone density scan.     Objective   Vital Signs:  /88 (BP Location: Right arm, Patient Position: Sitting)   Pulse 94   Temp 96.8 °F (36 °C) (Temporal)   Resp 16   Ht 154.9 cm (60.98\")   Wt 98.9 kg (218 lb)   SpO2 97%   BMI 41.21 kg/m²     Class 3 Severe Obesity (BMI >=40). Obesity-related health conditions include the following: hypertension, dyslipidemias, GERD and osteoarthritis. Obesity is improving with lifestyle modifications. BMI is is above average; BMI management plan is completed. We " discussed low calorie, low carb based diet program, portion control, increasing exercise and Weight Watchers or other Commercial based weight reduction program.      Physical Exam  Constitutional:       Appearance: Normal appearance. She is well-developed. She is obese.   HENT:      Head: Normocephalic and atraumatic.      Right Ear: Tympanic membrane, ear canal and external ear normal.      Left Ear: Tympanic membrane, ear canal and external ear normal.      Nose: Nose normal.      Mouth/Throat:      Mouth: Mucous membranes are moist.      Pharynx: Oropharynx is clear. No oropharyngeal exudate.   Eyes:      Extraocular Movements: Extraocular movements intact.      Conjunctiva/sclera: Conjunctivae normal.      Pupils: Pupils are equal, round, and reactive to light.   Cardiovascular:      Rate and Rhythm: Normal rate and regular rhythm.      Pulses: Normal pulses.      Heart sounds: Normal heart sounds.   Pulmonary:      Effort: Pulmonary effort is normal.      Breath sounds: Normal breath sounds.   Abdominal:      General: Bowel sounds are normal.      Palpations: Abdomen is soft.   Musculoskeletal:         General: Normal range of motion.      Cervical back: Normal range of motion and neck supple.      Comments: Pain that she is feeling is medial to her left scapula.  It is a deep intense pain between the spinous process of her thoracic spine and the scapula and about a 3 to 4 inch strip.  Involves about 3 dermatomes maybe 4.  Does not go to the right side.  Certain movements seem to make it  worse but it is difficult to determine what exactly  exacerbate the pain.  The pain can be present even when she is sitting very still like at night in bed.  Does not go through into the chest.  There is no rash.   Skin:     General: Skin is warm and dry.   Neurological:      General: No focal deficit present.      Mental Status: She is alert and oriented to person, place, and time. Mental status is at baseline.   Psychiatric:          Mood and Affect: Mood normal.         Behavior: Behavior normal.         Thought Content: Thought content normal.         Judgment: Judgment normal.        Result Review :                 Assessment and Plan    Diagnoses and all orders for this visit:    1. Mononeuropathy left posterior shoulder area       - Sindy has had this deep boring pain over the inside of her left scapula about the middle of her thoracic spine or up a little higher off and on for 10 to 15 years.        - Normally it would come for a few days or a few weeks and then go away.        - Since its last flare up a few weeks ago, it has been daily and persistent and intense.        - It is severe enough pain that she cannot rest at night and she has to sit up and lean forward and get pressure off of that area of her back.        - There has never been any rash develop over the area.        - Touching the area is exquisitely tender over the ribs which are probably all upper thoracic ribs on the left-hand side.        - The ribs then duck under the scapula and pretty much the pain is gone by then.        - She also has some pain in the lateral aspect of her elbow that feels similar to the deep boring pain by her shoulder blade.        - Movement does not necessarily make the pain any worse or any better.       - At the same time she can move her shoulders and get pressure off of the back by sitting forward or leaning forward and sometimes the pain will subside.        - We put her on pregabalin about a week ago and she is only taking 25 mg twice a day.        - I am going to have her start titrating her dose up a little bit quicker now that I know she can tolerate it.        - I would like to get her up to 75 mg twice a day rather quickly over the next week and then higher yet if needed.        - I am also going to have her rub capsaicin cream on the inside of her left scapula two or three times a day and see if that does not give her some  relief.        - Some Percocet 5 mg at night to help her rest and she can still use Aleve.        - We will see how this works until she can get in to see pain management Dr. Tompkins and also neurology Dr. Clement Lang.        - I need their opinion as to what else this could be.        - I am going to do a bone scan to make sure there is no bony lesion underneath this in the rib or in the scapula.        - She is a breast cancer survivor and I just want to make sure we are not missing anything there.        - Because it has been around for so long coming and going,       - I do not think that is going to be the case.        - I will see her back in 3 to 4 weeks and we will see how she is doing.        Follow Up   Return in about 4 weeks (around 6/8/2022) for Recheck.  Patient was given instructions and counseling regarding her condition or for health maintenance advice. Please see specific information pulled into the AVS if appropriate.     Transcribed from ambient dictation for Rubens Cali MD by James Madrid.  05/11/22   19:14 EDT    Patient verbalized consent to the visit recording.  I have personally performed the services described in this document as transcribed by the above individual, and it is both accurate and complete.  Rubens Cali MD  5/15/2022  13:49 EDT      3 2